# Patient Record
Sex: MALE | Race: WHITE | NOT HISPANIC OR LATINO | Employment: UNEMPLOYED | URBAN - METROPOLITAN AREA
[De-identification: names, ages, dates, MRNs, and addresses within clinical notes are randomized per-mention and may not be internally consistent; named-entity substitution may affect disease eponyms.]

---

## 2018-05-12 ENCOUNTER — HOSPITAL ENCOUNTER (EMERGENCY)
Facility: HOSPITAL | Age: 38
Discharge: HOME | End: 2018-05-13
Attending: EMERGENCY MEDICINE
Payer: COMMERCIAL

## 2018-05-12 VITALS
OXYGEN SATURATION: 98 % | DIASTOLIC BLOOD PRESSURE: 56 MMHG | SYSTOLIC BLOOD PRESSURE: 115 MMHG | HEIGHT: 68 IN | HEART RATE: 66 BPM | BODY MASS INDEX: 37.89 KG/M2 | RESPIRATION RATE: 18 BRPM | WEIGHT: 250 LBS | TEMPERATURE: 97.5 F

## 2018-05-12 DIAGNOSIS — M54.50 LOWER BACK PAIN: Primary | ICD-10-CM

## 2018-05-12 LAB
BILIRUB UR QL STRIP: NEGATIVE
CLARITY UR: CLEAR
COLOR UR: YELLOW
GLUCOSE UR STRIP-MCNC: NEGATIVE MG/DL
HGB UR QL STRIP.AUTO: ABNORMAL
KETONES UR STRIP-MCNC: NEGATIVE MG/DL
LEUKOCYTE ESTERASE UR QL STRIP: NEGATIVE
NITRITE UR QL STRIP: NEGATIVE
PH UR STRIP.AUTO: 7 [PH] (ref 4.5–8)
PROT UR STRIP-MCNC: NEGATIVE MG/DL
SP GR UR STRIP.AUTO: 1.02 (ref 1–1.03)
UROBILINOGEN UR QL STRIP.AUTO: 0.2 E.U./DL

## 2018-05-12 PROCEDURE — 81001 URINALYSIS AUTO W/SCOPE: CPT

## 2018-05-12 RX ORDER — LIDOCAINE 50 MG/G
1 PATCH TOPICAL ONCE
Status: DISCONTINUED | OUTPATIENT
Start: 2018-05-12 | End: 2018-05-13 | Stop reason: HOSPADM

## 2018-05-12 RX ORDER — IBUPROFEN 600 MG/1
600 TABLET ORAL ONCE
Status: COMPLETED | OUTPATIENT
Start: 2018-05-12 | End: 2018-05-12

## 2018-05-12 RX ORDER — METHOCARBAMOL 500 MG/1
500 TABLET, FILM COATED ORAL ONCE
Status: COMPLETED | OUTPATIENT
Start: 2018-05-12 | End: 2018-05-12

## 2018-05-12 RX ADMIN — IBUPROFEN 600 MG: 600 TABLET ORAL at 22:51

## 2018-05-12 RX ADMIN — LIDOCAINE 1 PATCH: 50 PATCH CUTANEOUS at 22:50

## 2018-05-12 RX ADMIN — METHOCARBAMOL 500 MG: 500 TABLET ORAL at 22:51

## 2018-05-13 LAB
BACTERIA UR QL AUTO: NORMAL /HPF
HYALINE CASTS #/AREA URNS LPF: NORMAL /LPF
NON-SQ EPI CELLS URNS QL MICRO: NORMAL /HPF
RBC #/AREA URNS AUTO: NORMAL /HPF
WBC #/AREA URNS AUTO: NORMAL /HPF

## 2018-05-13 PROCEDURE — 99283 EMERGENCY DEPT VISIT LOW MDM: CPT

## 2018-05-13 RX ORDER — ACETAMINOPHEN 325 MG/1
975 TABLET ORAL ONCE
Status: COMPLETED | OUTPATIENT
Start: 2018-05-13 | End: 2018-05-13

## 2018-05-13 RX ORDER — DIAZEPAM 5 MG/1
10 TABLET ORAL ONCE
Status: COMPLETED | OUTPATIENT
Start: 2018-05-13 | End: 2018-05-13

## 2018-05-13 RX ADMIN — DIAZEPAM 10 MG: 5 TABLET ORAL at 00:38

## 2018-05-13 RX ADMIN — ACETAMINOPHEN 975 MG: 325 TABLET, FILM COATED ORAL at 00:39

## 2018-05-13 NOTE — ED PROVIDER NOTES
History  Chief Complaint   Patient presents with    Back Pain     pt c/o lower back pain  States it hurt all week but got worse today  Pt unable to ambulate d/t pain  Denies numbness or tingling  Assist to get out of car  HPI  This is a 36-year-old male with no medical history, presents with acute back pain  The patient states that he was having some back pain all week, however became worse after he lifted a cat litter off the ground, which she noted to be particularly heavy  He used an  Lidoderm patch that his grandmother had, as well as took a dose of ibuprofen, which did not significantly help his pain, any progressively got worse over the course the day, so he came to the emergency department for further evaluation  He states that his pain is lower back in location, as well as diffusely over his paraspinal muscles, and his midline spine  He states the pain is worse when walking, and worse with moving, especially when moving his left leg  He denies any sharp, shooting pains down his legs  He denies any fevers, chills, chest pain, shortness of breath, abdominal pain, nausea, vomiting, diarrhea  He has no urinary retention or incontinence, or tenesmus or incontinence of stool  The patient denies any history of IV drug abuse, as well as a history of diabetes, or steroid use  He does have a history of multiple masses all over his body which he has had for 7 years, which are soft, which the patient thinks are lipomas, but he has never actually had these worked up  None       Past Medical History:   Diagnosis Date    Back pain        History reviewed  No pertinent surgical history  History reviewed  No pertinent family history  I have reviewed and agree with the history as documented  Social History   Substance Use Topics    Smoking status: Never Smoker    Smokeless tobacco: Never Used    Alcohol use No        Review of Systems   Constitutional: Negative for chills and fever     HENT: Negative for congestion and hearing loss  Eyes: Negative for photophobia and visual disturbance  Respiratory: Negative for cough and shortness of breath  Cardiovascular: Negative for chest pain and palpitations  Gastrointestinal: Negative for abdominal pain, diarrhea, nausea and vomiting  Endocrine: Negative for polyphagia and polyuria  Genitourinary: Negative for difficulty urinating, dysuria and frequency  Musculoskeletal: Positive for back pain  Negative for neck pain and neck stiffness  Skin: Negative for pallor and rash  Neurological: Negative for light-headedness and headaches  Physical Exam  ED Triage Vitals   Temperature Pulse Respirations Blood Pressure SpO2   05/12/18 2157 05/12/18 2157 05/12/18 2156 05/12/18 2157 05/12/18 2157   97 5 °F (36 4 °C) 69 (!) 28 144/79 100 %      Temp Source Heart Rate Source Patient Position - Orthostatic VS BP Location FiO2 (%)   05/12/18 2157 05/12/18 2156 05/12/18 2156 05/12/18 2156 --   Tympanic Monitor Sitting Left arm       Pain Score       05/12/18 2156       Worst Possible Pain           Orthostatic Vital Signs  Vitals:    05/12/18 2156 05/12/18 2157 05/12/18 2336   BP:  144/79 115/56   Pulse:  69 66   Patient Position - Orthostatic VS: Sitting  Lying       Physical Exam   Constitutional: He is oriented to person, place, and time  Awake and alert, well appearing, severe distress secondary to pain  No increased respiratory effort  Nontoxic in appearance   HENT:   Head: Normocephalic  Mouth/Throat: Oropharynx is clear and moist  No oropharyngeal exudate  Eyes: Pupils are equal, round, and reactive to light  No scleral icterus  Neck: Normal range of motion  No JVD present  Cardiovascular: Normal rate, regular rhythm and normal heart sounds  No murmur heard  Pulmonary/Chest: Effort normal  No respiratory distress  He has no wheezes  He has no rales  Abdominal: Soft  He exhibits no distension  There is no tenderness  Genitourinary:   Genitourinary Comments: Rectum is normal  Normal rectal tone  No saddle anesthesia  Musculoskeletal:   There is significant tenderness to palpation over the paraspinal muscles, as well as the midline spine in the L2-L5 distribution  No overlying external signs of trauma  Neurological: He is alert and oriented to person, place, and time  He exhibits normal muscle tone  Skin: Skin is warm and dry  No rash noted  No pallor         ED Medications  Medications   lidocaine (LIDODERM) 5 % patch 1 patch (1 patch Transdermal Medication Applied 5/12/18 2250)   ibuprofen (MOTRIN) tablet 600 mg (600 mg Oral Given 5/12/18 2251)   methocarbamol (ROBAXIN) tablet 500 mg (500 mg Oral Given 5/12/18 2251)   diazepam (VALIUM) tablet 10 mg (10 mg Oral Given 5/13/18 0038)   acetaminophen (TYLENOL) tablet 975 mg (975 mg Oral Given 5/13/18 0039)       Diagnostic Studies  Results Reviewed     Procedure Component Value Units Date/Time    Urine Microscopic [[id number]]  (Normal) Collected:  05/12/18 2338    Lab Status:  Final result Specimen:  Urine from Urine, Clean Catch Updated:  05/13/18 0001     RBC, UA None Seen /hpf      WBC, UA None Seen /hpf      Epithelial Cells None Seen /hpf      Bacteria, UA None Seen /hpf      Hyaline Casts, UA None Seen /lpf     ED Urine Macroscopic [[id number]]  (Abnormal) Collected:  05/12/18 2338    Lab Status:  Final result Specimen:  Urine Updated:  05/12/18 2334     Color, UA Yellow     Clarity, UA Clear     pH, UA 7 0     Leukocytes, UA Negative     Nitrite, UA Negative     Protein, UA Negative mg/dl      Glucose, UA Negative mg/dl      Ketones, UA Negative mg/dl      Urobilinogen, UA 0 2 E U /dl      Bilirubin, UA Negative     Blood, UA Trace (A)     Specific [place], UA 1 020    Narrative:       CLINITEK RESULT    POCT urinalysis dipstick [[id number]]     Lab Status:  No result Specimen:  Urine                  No orders to display         Procedures  Procedures      Phone Consults  ED Phone Contact    ED Course                               MDM  Number of Diagnoses or Management Options  Lower back pain:   Diagnosis management comments: This is a 31-year-old male who presents with acute onset of back pain, after bending over and lifting a heavy hunter litter off the ground, resulting in immediate severe pain  He is currently hemodynamically stable, but appears in significant discomfort due to his back pain  On exam, he has significant tenderness in the paraspinal muscles at the midline spine of the L2-L5 distribution, without any external signs of trauma  He has no saddle anesthesia, normal rectal tone, and denies any urinary retension, tenesmus, or incontinence  Patient was given ibuprofen and Robaxin, as well as a Lidoderm patch, without significant relief  He was also given a dose of Tylenol, and p o  Valium, and at this point felt comfortable enough to stand up and walk  Likely muscle strain versus herniated disc, but without cauda equina based on his history and my physical exam   Patient be discharged home  Strict return precautions were provided  I instructed to follow up with his primary care physician at his earliest convenience  CritCare Time    Disposition  Final diagnoses:   Lower back pain     Time reflects when diagnosis was documented in both MDM as applicable and the Disposition within this note     Time User Action Codes Description Comment    5/13/2018  1:02 AM Meliza Goddard Add [M54 5] Lower back pain       ED Disposition     ED Disposition Condition Comment    Discharge  [facility] discharge to home/self care      Condition at discharge: Good        Follow-up Information     Follow up With Specialties Details Why Contact Info    Kavitha Tesfaye MD  Schedule an appointment as soon as possible for a visit in 1 day  [address] [address]  55 JOSHUA Arevalo Se, MD Orthopedic Surgery Call As needed after following up with your primary care physician [address] [place] [address]  [phone]          There are no discharge medications for this patient  No discharge procedures on file  ED Provider  Attending physically available and evaluated Kiki Aspen  I managed the patient along with the ED Attending      Electronically Signed by         Lily Rdz MD  05/13/18 6690

## 2018-05-13 NOTE — ED NOTES
Pt assisted to room via wheelchair  Staff assisted patient to bed safely x 3  Pt states he has lower back pain and pressure in his pelvic/lower back area        Maria Fernanda Lang RN  05/12/18 2348

## 2018-05-13 NOTE — ED ATTENDING ATTESTATION
Trever Roman MD, saw and evaluated the patient  I have discussed the patient with the resident/non-physician practitioner and agree with the resident's/non-physician practitioner's findings, Plan of Care, and MDM as documented in the resident's/non-physician practitioner's note, except where noted  All available labs and Radiology studies were reviewed  At this point I agree with the current assessment done in the Emergency Department  I have conducted an independent evaluation of this patient including a focused history and a physical exam     36-year-old male presenting to the emergency department for bilateral paraspinal lumbar pain  Patient reports fairly abrupt onset, approximately 10-15 minutes after having an injury to his back where he was lifting a 40 pound box of cat litter which started as slipped and he attempted to catch it  The patient had initial pain in the back that then worsened of her 10-15 minutes  No bowel or bladder incontinence  No history of IV drug use  No injections  No diabetes  No saddle anesthesia  Ten systems reviewed negative except as noted  On examination patient is lying on his side and is moderately uncomfortable  Abdomen is soft and nontender to palpation  Patient has tenderness to palpation of bilateral inguinal canals without any appreciated inguinal hernia  Patient has diffuse tenderness of his bilateral lumbar paraspinal musculature  Motor is 5/5 bilateral lower extremities  Sensation intact  Skin warm and dry  Assessment and plan:  36-year-old male musculoskeletal low back pain  Patient will be treated symptomatically and reassess      Labs Reviewed   ED URINE MACROSCOPIC - Abnormal        Result Value Ref Range Status    Color, UA Yellow   Final    Clarity, UA Clear   Final    pH, UA 7 0  4 5 - 8 0 Final    Leukocytes, UA Negative  Negative Final    Nitrite, UA Negative  Negative Final    Protein, UA Negative  Negative mg/dl Final    Glucose, UA Negative  Negative mg/dl Final    Ketones, UA Negative  Negative mg/dl Final    Urobilinogen, UA 0 2  0 2, 1 0 E U /dl E U /dl Final    Bilirubin, UA Negative  Negative Final    Blood, UA Trace (*) Negative Final    Specific Gravity, UA 1 020  1 003 - 1 030 Final    Narrative:     CLINITEK RESULT   URINE MICROSCOPIC - Normal    RBC, UA None Seen  None Seen, 0-5 /hpf Final    WBC, UA None Seen  None Seen, 0-5, 5-55, 5-65 /hpf Final    Epithelial Cells None Seen  None Seen, Occasional /hpf Final    Bacteria, UA None Seen  None Seen, Occasional /hpf Final    Hyaline Casts, UA None Seen  None Seen /lpf Final

## 2018-05-13 NOTE — DISCHARGE INSTRUCTIONS
Acute Low Back Pain   WHAT YOU NEED TO KNOW:   Acute low back pain is sudden discomfort in your lower back area that lasts for up to 6 weeks  The discomfort makes it difficult to tolerate activity  DISCHARGE INSTRUCTIONS:   Return to the emergency department if:   · You have severe pain  · You have sudden stiffness and heaviness on both buttocks down to both legs  · You have numbness or weakness in one leg, or pain in both legs  · You have numbness in your genital area or across your lower back  · You cannot control your urine or bowel movements  Contact your healthcare provider if:   · You have a fever  · You have pain at night or when you rest     · Your pain does not get better with treatment  · You have pain that worsens when you cough or sneeze  · You suddenly feel something pop or snap in your back  · You have questions or concerns about your condition or care  Medicines: The following medicines may be ordered by your healthcare provider:  · Acetaminophen  decreases pain  It is available without a doctor's order  Ask how much to take and how often to take it  Follow directions  Acetaminophen can cause liver damage if not taken correctly  · NSAIDs  help decrease swelling and pain  This medicine is available with or without a doctor's order  NSAIDs can cause stomach bleeding or kidney problems in certain people  If you take blood thinner medicine, always ask your healthcare provider if NSAIDs are safe for you  Always read the medicine label and follow directions  · Prescription pain medicine  may be given  Ask your healthcare provider how to take this medicine safely  · Muscle relaxers  decrease pain by relaxing the muscles in your lower spine  · Take your medicine as directed  Contact your healthcare provider if you think your medicine is not helping or if you have side effects  Tell him of her if you are allergic to any medicine   Keep a list of the medicines, vitamins, and herbs you take  Include the amounts, and when and why you take them  Bring the list or the pill bottles to follow-up visits  Carry your medicine list with you in case of an emergency  Self-care:   · Stay active  as much as you can without causing more pain  Bed rest could make your back pain worse  Start with some light exercises such as walking  Avoid heavy lifting until your pain is gone  Ask for more information about the activities or exercises that are right for you  · Ice  helps decrease swelling, pain, and muscle spams  Put crushed ice in a plastic bag  Cover it with a towel  Place it on your lower back for 20 to 30 minutes every 2 hours  Do this for about 2 to 3 days after your pain starts, or as directed  · Heat  helps decrease pain and muscle spasms  Start to use heat after treatment with ice has stopped  Use a small towel dampened with warm water or a heating pad, or sit in a warm bath  Apply heat on the area for 20 to 30 minutes every 2 hours for as many days as directed  Alternate heat and ice  Prevent acute low back pain:   · Use proper body mechanics  ¨ Bend at the hips and knees when you  objects  Do not bend from the waist  Use your leg muscles as you lift the load  Do not use your back  Keep the object close to your chest as you lift it  Try not to twist or lift anything above your waist     ¨ Change your position often when you stand for long periods of time  Rest one foot on a small box or footrest, and then switch to the other foot often  ¨ Try not to sit for long periods of time  When you do, sit in a straight-backed chair with your feet flat on the floor  Never reach, pull, or push while you are sitting  · Do exercises that strengthen your back muscles  Warm up before you exercise  Ask your healthcare provider the best exercises for you  · Maintain a healthy weight  Ask your healthcare provider how much you should weigh   Ask him to help you create a weight loss plan if you are overweight  Follow up with your healthcare provider as directed:  Return for a follow-up visit if you still have pain after 1 to 3 weeks of treatment  You may need to visit an orthopedist if your back pain lasts more than 12 weeks  Write down your questions so you remember to ask them during your visits  © 2017 [employer]  Information is for End User's use only and may not be sold, redistributed or otherwise used for commercial purposes  All illustrations and images included in CareNotes® are the copyrighted property of [employer]  or [employer]  The above information is an  only  It is not intended as medical advice for individual conditions or treatments  Talk to your doctor, nurse or pharmacist before following any medical regimen to see if it is safe and effective for you

## 2024-08-26 ENCOUNTER — OFFICE VISIT (OUTPATIENT)
Dept: INTERNAL MEDICINE CLINIC | Facility: CLINIC | Age: 44
End: 2024-08-26
Payer: COMMERCIAL

## 2024-08-26 VITALS
HEART RATE: 59 BPM | TEMPERATURE: 97.6 F | RESPIRATION RATE: 18 BRPM | HEIGHT: 68 IN | SYSTOLIC BLOOD PRESSURE: 110 MMHG | BODY MASS INDEX: 34.71 KG/M2 | WEIGHT: 229 LBS | DIASTOLIC BLOOD PRESSURE: 64 MMHG | OXYGEN SATURATION: 97 %

## 2024-08-26 DIAGNOSIS — R59.0 LYMPHADENOPATHY, AXILLARY: ICD-10-CM

## 2024-08-26 DIAGNOSIS — Z00.00 HEALTHCARE MAINTENANCE: Primary | ICD-10-CM

## 2024-08-26 DIAGNOSIS — R19.7 DIARRHEA, UNSPECIFIED TYPE: ICD-10-CM

## 2024-08-26 PROCEDURE — 99203 OFFICE O/P NEW LOW 30 MIN: CPT | Performed by: INTERNAL MEDICINE

## 2024-08-26 NOTE — PROGRESS NOTES
Ambulatory Visit  Name: Saturnino Dumont      : 1980      MRN: [id number]  Encounter Provider: Eleazar Blum DO  Encounter Date: 2024   Encounter department: [facility] INTERNAL MEDICINE    Assessment & Plan   1. Healthcare maintenance  Assessment & Plan:  Patient is a 43-year-old male with a history of lumbar disc disease status postlaminectomy in the past, no other major medical problems who is here today to establish care in our medical practice.  His major concern is having problems with frequent loose stools since May of this year.  He states he does have some abdominal cramping associated with this but not severe.  No nausea vomiting no black stools blood in his stools and no problems with weight loss or decreased appetite.  Patient is not taking any over-the-counter medication in order to help with this and has had no medical workup and evaluation.  Patient otherwise has a complaint of some intermittent enlarged lymph nodes to the axillary region which do resolve when he stops using deodorant.  Patient did undergo physical exam today in the office but no acute abnormalities.  Patient will be going for routine lab testing including stool testing.  He was reassured we will call patient if there is any abnormalities that are acute that we need to address at this point in time.  He will be seen again in the office approximately 2 to 3 weeks for reevaluation.  We placed a consult for the patient to be seen by gastroenterology for his frequent loose stools.  Orders:  -     Comprehensive metabolic panel; Future; Expected date: 2024  -     CBC and differential; Future; Expected date: 2024  -     Lipid panel; Future; Expected date: 2024  -     Urinalysis with microscopic; Future  -     Stool Enteric Bacterial Panel by PCR; Future  -     Ambulatory Referral to Gastroenterology; Future  2. Diarrhea, unspecified type  Assessment & Plan:  Again frequent loose stools which she  states is new for him.  No abdominal severe pain or cramping no black stools or blood in the stools.  Patient does have some urgency to move his bowels.  Denies any food intolerance but he admits that his diet is not perfect.  We will check routine stool test.  Patient will be also going for routine labs and further lab testing as indicated.  We will call the patient if there is any abnormal lab values that we need to correct at this point in time or for further evaluation.  Orders:  -     Ambulatory Referral to Gastroenterology; Future  3. Lymphadenopathy, axillary  Assessment & Plan:  Patient states that he has had recurrence of enlarged lymph nodes to the axillary region bilaterally intermittently.  He states that when he stopped using deodorant this usually goes away on its own.  No lymphadenopathy noted on exam today.  Will check routine labs including a CBC and full physical exam with his next visit.         History of Present Illness     Patient is a 43-year-old male history of lumbar disc disease with laminectomy in the past who is here today to establish care in our medical practice complaining of persistent difficulty with loose stools since May of this year.  Denies any black stools or blood in the stools no severe abdominal pain or cramping.  No nausea or vomiting.  Is also complaining of some lymphadenopathy axillary region bilaterally episodic pain      Review of Systems   Constitutional: Negative.    HENT: Negative.     Eyes: Negative.    Respiratory: Negative.     Cardiovascular: Negative.    Gastrointestinal: Negative.         Frequent loose stools.  States that this is not diarrhea.  Has some fecal urgency   Endocrine: Negative.    Genitourinary: Negative.    Musculoskeletal: Negative.    Skin: Negative.    Allergic/Immunologic: Negative.    Neurological: Negative.    Hematological:  Positive for adenopathy. Does not bruise/bleed easily.   Psychiatric/Behavioral: Negative.       Past Medical  "History:   Diagnosis Date    Back pain      No past surgical history on file.  No family history on file.  Social History     Tobacco Use    Smoking status: Never    Smokeless tobacco: Never   Vaping Use    Vaping status: Never Used   Substance and Sexual Activity    Alcohol use: No    Drug use: No    Sexual activity: Not on file     No current outpatient medications on file prior to visit.     No Known Allergies    There is no immunization history on file for this patient.  Objective     /64   Pulse 59   Temp 97.6 °F (36.4 °C)   Resp 18   Ht 5' 8\" (1.727 m)   Wt 104 kg (229 lb)   SpO2 97%   BMI 34.82 kg/m²     Physical Exam  Vitals and nursing note reviewed.   Constitutional:       General: He is not in acute distress.     Appearance: Normal appearance. He is obese. He is not ill-appearing, toxic-appearing or diaphoretic.      Comments: Pleasant, articulate, obese 43-year-old male who is awake alert in no acute distress oriented x 3   HENT:      Head: Normocephalic and atraumatic.      Right Ear: Tympanic membrane, ear canal and external ear normal. There is no impacted cerumen.      Left Ear: Tympanic membrane, ear canal and external ear normal. There is no impacted cerumen.      Nose: Nose normal. No congestion or rhinorrhea.      Mouth/Throat:      Mouth: Mucous membranes are moist.      Pharynx: Oropharynx is clear. No oropharyngeal exudate or posterior oropharyngeal erythema.   Eyes:      General: No scleral icterus.        Right eye: No discharge.         Left eye: No discharge.      Extraocular Movements: Extraocular movements intact.      Conjunctiva/sclera: Conjunctivae normal.      Pupils: Pupils are equal, round, and reactive to light.   Neck:      Vascular: No carotid bruit.   Cardiovascular:      Rate and Rhythm: Normal rate and regular rhythm.      Pulses: Normal pulses.      Heart sounds: Normal heart sounds. No murmur heard.     No friction rub. No gallop.   Pulmonary:      Effort: " Pulmonary effort is normal. No respiratory distress.      Breath sounds: Normal breath sounds. No stridor. No wheezing, rhonchi or rales.   Chest:      Chest wall: No tenderness.   Abdominal:      General: Abdomen is flat. Bowel sounds are normal. There is no distension.      Palpations: Abdomen is soft. There is no mass.      Tenderness: There is no abdominal tenderness. There is no right CVA tenderness, left CVA tenderness, guarding or rebound.      Hernia: No hernia is present.   Musculoskeletal:         General: No swelling, tenderness, deformity or signs of injury. Normal range of motion.      Cervical back: Normal range of motion and neck supple. No rigidity or tenderness.      Right lower leg: No edema.      Left lower leg: No edema.   Lymphadenopathy:      Cervical: No cervical adenopathy.   Skin:     General: Skin is warm.      Capillary Refill: Capillary refill takes less than 2 seconds.      Coloration: Skin is not jaundiced or pale.      Findings: Lesion present. No bruising, erythema or rash.      Comments: Multiple subcutaneous nodules to the abdomen, chest wall, lower extremities.  He states he has had these biopsied and these are benign lesions.   Neurological:      General: No focal deficit present.      Mental Status: He is alert and oriented to person, place, and time. Mental status is at baseline.      Cranial Nerves: No cranial nerve deficit.      Sensory: No sensory deficit.      Motor: No weakness.      Coordination: Coordination normal.      Gait: Gait normal.      Deep Tendon Reflexes: Reflexes normal.   Psychiatric:         Mood and Affect: Mood normal.         Behavior: Behavior normal.         Thought Content: Thought content normal.         Judgment: Judgment normal.

## 2024-08-26 NOTE — ASSESSMENT & PLAN NOTE
Patient is a 43-year-old male with a history of lumbar disc disease status postlaminectomy in the past, no other major medical problems who is here today to establish care in our medical practice.  His major concern is having problems with frequent loose stools since May of this year.  He states he does have some abdominal cramping associated with this but not severe.  No nausea vomiting no black stools blood in his stools and no problems with weight loss or decreased appetite.  Patient is not taking any over-the-counter medication in order to help with this and has had no medical workup and evaluation.  Patient otherwise has a complaint of some intermittent enlarged lymph nodes to the axillary region which do resolve when he stops using deodorant.  Patient did undergo physical exam today in the office but no acute abnormalities.  Patient will be going for routine lab testing including stool testing.  He was reassured we will call patient if there is any abnormalities that are acute that we need to address at this point in time.  He will be seen again in the office approximately 2 to 3 weeks for reevaluation.  We placed a consult for the patient to be seen by gastroenterology for his frequent loose stools.

## 2024-08-26 NOTE — ASSESSMENT & PLAN NOTE
Patient states that he has had recurrence of enlarged lymph nodes to the axillary region bilaterally intermittently.  He states that when he stopped using deodorant this usually goes away on its own.  No lymphadenopathy noted on exam today.  Will check routine labs including a CBC and full physical exam with his next visit.

## 2024-08-26 NOTE — ASSESSMENT & PLAN NOTE
Again frequent loose stools which she states is new for him.  No abdominal severe pain or cramping no black stools or blood in the stools.  Patient does have some urgency to move his bowels.  Denies any food intolerance but he admits that his diet is not perfect.  We will check routine stool test.  Patient will be also going for routine labs and further lab testing as indicated.  We will call the patient if there is any abnormal lab values that we need to correct at this point in time or for further evaluation.

## 2024-08-30 ENCOUNTER — APPOINTMENT (OUTPATIENT)
Dept: LAB | Facility: CLINIC | Age: 44
End: 2024-08-30
Payer: COMMERCIAL

## 2024-08-30 DIAGNOSIS — Z00.00 HEALTHCARE MAINTENANCE: ICD-10-CM

## 2024-08-30 LAB
ALBUMIN SERPL BCG-MCNC: 4.3 G/DL (ref 3.5–5)
ALP SERPL-CCNC: 71 U/L (ref 34–104)
ALT SERPL W P-5'-P-CCNC: 25 U/L (ref 7–52)
ANION GAP SERPL CALCULATED.3IONS-SCNC: 9 MMOL/L (ref 4–13)
AST SERPL W P-5'-P-CCNC: 18 U/L (ref 13–39)
BACTERIA UR QL AUTO: ABNORMAL /HPF
BASOPHILS # BLD AUTO: 0.03 THOUSANDS/ÂΜL (ref 0–0.1)
BASOPHILS NFR BLD AUTO: 0 % (ref 0–1)
BILIRUB SERPL-MCNC: 0.62 MG/DL (ref 0.2–1)
BILIRUB UR QL STRIP: NEGATIVE
BUN SERPL-MCNC: 20 MG/DL (ref 5–25)
CALCIUM SERPL-MCNC: 9.3 MG/DL (ref 8.4–10.2)
CHLORIDE SERPL-SCNC: 104 MMOL/L (ref 96–108)
CHOLEST SERPL-MCNC: 204 MG/DL
CLARITY UR: CLEAR
CO2 SERPL-SCNC: 26 MMOL/L (ref 21–32)
COLOR UR: ABNORMAL
CREAT SERPL-MCNC: 0.76 MG/DL (ref 0.6–1.3)
EOSINOPHIL # BLD AUTO: 0.1 THOUSAND/ÂΜL (ref 0–0.61)
EOSINOPHIL NFR BLD AUTO: 2 % (ref 0–6)
ERYTHROCYTE [DISTWIDTH] IN BLOOD BY AUTOMATED COUNT: 13.2 % (ref 11.6–15.1)
GFR SERPL CREATININE-BSD FRML MDRD: 112 ML/MIN/1.73SQ M
GLUCOSE P FAST SERPL-MCNC: 97 MG/DL (ref 65–99)
GLUCOSE UR STRIP-MCNC: NEGATIVE MG/DL
HCT VFR BLD AUTO: 43.2 % (ref 36.5–49.3)
HDLC SERPL-MCNC: 58 MG/DL
HGB BLD-MCNC: 14.7 G/DL (ref 12–17)
HGB UR QL STRIP.AUTO: NEGATIVE
IMM GRANULOCYTES # BLD AUTO: 0.02 THOUSAND/UL (ref 0–0.2)
IMM GRANULOCYTES NFR BLD AUTO: 0 % (ref 0–2)
KETONES UR STRIP-MCNC: NEGATIVE MG/DL
LDLC SERPL CALC-MCNC: 131 MG/DL (ref 0–100)
LEUKOCYTE ESTERASE UR QL STRIP: NEGATIVE
LYMPHOCYTES # BLD AUTO: 2.84 THOUSANDS/ÂΜL (ref 0.6–4.47)
LYMPHOCYTES NFR BLD AUTO: 42 % (ref 14–44)
MCH RBC QN AUTO: 28.4 PG (ref 26.8–34.3)
MCHC RBC AUTO-ENTMCNC: 34 G/DL (ref 31.4–37.4)
MCV RBC AUTO: 83 FL (ref 82–98)
MONOCYTES # BLD AUTO: 0.83 THOUSAND/ÂΜL (ref 0.17–1.22)
MONOCYTES NFR BLD AUTO: 12 % (ref 4–12)
NEUTROPHILS # BLD AUTO: 2.92 THOUSANDS/ÂΜL (ref 1.85–7.62)
NEUTS SEG NFR BLD AUTO: 44 % (ref 43–75)
NITRITE UR QL STRIP: NEGATIVE
NON-SQ EPI CELLS URNS QL MICRO: ABNORMAL /HPF
NONHDLC SERPL-MCNC: 146 MG/DL
NRBC BLD AUTO-RTO: 0 /100 WBCS
PH UR STRIP.AUTO: 7 [PH]
PLATELET # BLD AUTO: 238 THOUSANDS/UL (ref 149–390)
PMV BLD AUTO: 10.1 FL (ref 8.9–12.7)
POTASSIUM SERPL-SCNC: 4.3 MMOL/L (ref 3.5–5.3)
PROT SERPL-MCNC: 6.9 G/DL (ref 6.4–8.4)
PROT UR STRIP-MCNC: NEGATIVE MG/DL
RBC # BLD AUTO: 5.18 MILLION/UL (ref 3.88–5.62)
RBC #/AREA URNS AUTO: ABNORMAL /HPF
SODIUM SERPL-SCNC: 139 MMOL/L (ref 135–147)
SP GR UR STRIP.AUTO: 1.01 (ref 1–1.03)
TRIGL SERPL-MCNC: 77 MG/DL
UROBILINOGEN UR STRIP-ACNC: <2 MG/DL
WBC # BLD AUTO: 6.74 THOUSAND/UL (ref 4.31–10.16)
WBC #/AREA URNS AUTO: ABNORMAL /HPF

## 2024-08-30 PROCEDURE — 80061 LIPID PANEL: CPT

## 2024-08-30 PROCEDURE — 85025 COMPLETE CBC W/AUTO DIFF WBC: CPT

## 2024-08-30 PROCEDURE — 81001 URINALYSIS AUTO W/SCOPE: CPT

## 2024-08-30 PROCEDURE — 80053 COMPREHEN METABOLIC PANEL: CPT

## 2024-08-30 PROCEDURE — 36415 COLL VENOUS BLD VENIPUNCTURE: CPT

## 2024-08-31 ENCOUNTER — APPOINTMENT (OUTPATIENT)
Dept: LAB | Facility: CLINIC | Age: 44
End: 2024-08-31
Payer: COMMERCIAL

## 2024-08-31 DIAGNOSIS — Z00.00 HEALTHCARE MAINTENANCE: ICD-10-CM

## 2024-08-31 PROCEDURE — 87505 NFCT AGENT DETECTION GI: CPT

## 2024-09-01 LAB
C COLI+JEJUNI TUF STL QL NAA+PROBE: NEGATIVE
EC STX1+STX2 GENES STL QL NAA+PROBE: NEGATIVE
SALMONELLA SP SPAO STL QL NAA+PROBE: NEGATIVE
SHIGELLA SP+EIEC IPAH STL QL NAA+PROBE: NEGATIVE

## 2024-09-04 ENCOUNTER — TELEPHONE (OUTPATIENT)
Age: 44
End: 2024-09-04

## 2024-09-04 NOTE — TELEPHONE ENCOUNTER
Patient called in with some concerns to his back. A little less then two years ago he went through surgery for Cada equina. States that its not common, however he had surgery and went to therapy for a while. Appears to be until about 6 months ago. In the past 3 or 4 days he's noticed that his back discomfort is coming back. States that the first thing that alerted him was he was trying to go to the bathroom and noticed that he did not have control. He was wondering what next steps are. Can he get another PT referral, or should he be coming in for an appointment sooner. He has a follow up appointment on 9/18 but wants to make sure there is enough time to discuss  this then if its appropriate for him to wait till then. Please advise. He is concerned as he mentioned that he does not want this to continue worsening and then him end up paralyzed.

## 2024-09-05 DIAGNOSIS — M54.9 BACK PAIN, UNSPECIFIED BACK LOCATION, UNSPECIFIED BACK PAIN LATERALITY, UNSPECIFIED CHRONICITY: Primary | ICD-10-CM

## 2024-09-18 ENCOUNTER — OFFICE VISIT (OUTPATIENT)
Age: 44
End: 2024-09-18
Payer: COMMERCIAL

## 2024-09-18 VITALS
BODY MASS INDEX: 34.86 KG/M2 | HEIGHT: 68 IN | TEMPERATURE: 97.2 F | RESPIRATION RATE: 16 BRPM | SYSTOLIC BLOOD PRESSURE: 106 MMHG | WEIGHT: 230 LBS | HEART RATE: 58 BPM | OXYGEN SATURATION: 98 % | DIASTOLIC BLOOD PRESSURE: 64 MMHG

## 2024-09-18 DIAGNOSIS — R19.7 DIARRHEA, UNSPECIFIED TYPE: ICD-10-CM

## 2024-09-18 DIAGNOSIS — E78.01 FAMILIAL HYPERCHOLESTEROLEMIA: Primary | ICD-10-CM

## 2024-09-18 DIAGNOSIS — Z00.00 HEALTHCARE MAINTENANCE: ICD-10-CM

## 2024-09-18 PROCEDURE — 99214 OFFICE O/P EST MOD 30 MIN: CPT | Performed by: INTERNAL MEDICINE

## 2024-09-18 RX ORDER — ROSUVASTATIN CALCIUM 5 MG/1
5 TABLET, COATED ORAL DAILY
Qty: 30 TABLET | Refills: 5 | Status: SHIPPED | OUTPATIENT
Start: 2024-09-18

## 2024-09-18 NOTE — ASSESSMENT & PLAN NOTE
As noted cholesterol is high but and not in a dangerous range.  I still feel because of family history it is appropriate to start medication in order to better control his cholesterol and we did discuss the importance also of diet watching his intake of fats and cholesterol with his diet.  Start Crestor 5 mg daily check a lipid profile in 6 months.    Orders:    rosuvastatin (CRESTOR) 5 mg tablet; Take 1 tablet (5 mg total) by mouth daily    Lipid panel; Future

## 2024-09-18 NOTE — ASSESSMENT & PLAN NOTE
Patient did have complete labs performed prior to the visit today and he is here to discuss the results.  The only abnormality of concern is his cholesterol especially with a family history of cerebrovascular disease and after discussion with the patient we have made a decision to treat.  Patient will be placed on a low-dose of Crestor 5 mg daily.  We did discuss possible side effects of the medication including muscle aches and pains and allergic type reactions and he is told if any adverse reaction to the medication to call immediately.  Check a lipid profile in 6 months.

## 2024-09-18 NOTE — ASSESSMENT & PLAN NOTE
Hemoccult was negative.  Patient is still having loose stools.  Does have an appointment to be seen and evaluation by gastroenterology.

## 2024-09-18 NOTE — PROGRESS NOTES
Ambulatory Visit  Name: Saturnino Dumont      : 1980      MRN: [id number]  Encounter Provider: Eleazar Blum DO  Encounter Date: 2024   Encounter department: [facility]    Assessment & Plan  Healthcare maintenance  Patient did have complete labs performed prior to the visit today and he is here to discuss the results.  The only abnormality of concern is his cholesterol especially with a family history of cerebrovascular disease and after discussion with the patient we have made a decision to treat.  Patient will be placed on a low-dose of Crestor 5 mg daily.  We did discuss possible side effects of the medication including muscle aches and pains and allergic type reactions and he is told if any adverse reaction to the medication to call immediately.  Check a lipid profile in 6 months.         Familial hypercholesterolemia  As noted cholesterol is high but and not in a dangerous range.  I still feel because of family history it is appropriate to start medication in order to better control his cholesterol and we did discuss the importance also of diet watching his intake of fats and cholesterol with his diet.  Start Crestor 5 mg daily check a lipid profile in 6 months.    Orders:    rosuvastatin (CRESTOR) 5 mg tablet; Take 1 tablet (5 mg total) by mouth daily    Lipid panel; Future    Diarrhea, unspecified type  Hemoccult was negative.  Patient is still having loose stools.  Does have an appointment to be seen and evaluation by gastroenterology.              History of Present Illness     Patient is a 43-year-old male history of no major medical problems who is here today for follow-up.  Patient is just initiated treatment through our office and that he did have extensive lab testing performed prior to the visit today and is here to discuss the results.  Patient relates he is feeling relatively well physically but is under a lot of stress with illness with his grandmother  "who is 90 years old and recent accident injury to his mother who recently went through surgery because of accidental fall injury to the face and her cervical spine.      Review of Systems   Constitutional: Negative.    HENT: Negative.     Eyes: Negative.    Respiratory: Negative.     Cardiovascular: Negative.    Gastrointestinal: Negative.    Endocrine: Negative.    Genitourinary: Negative.    Musculoskeletal: Negative.    Skin: Negative.    Allergic/Immunologic: Negative.    Neurological: Negative.    Hematological: Negative.    Psychiatric/Behavioral: Negative.       Past Medical History:   Diagnosis Date    Back pain      No past surgical history on file.  No family history on file.  Social History     Tobacco Use    Smoking status: Never    Smokeless tobacco: Never   Vaping Use    Vaping status: Never Used   Substance and Sexual Activity    Alcohol use: No    Drug use: No    Sexual activity: Not on file     No current outpatient medications on file prior to visit.     No Known Allergies    There is no immunization history on file for this patient.  Objective     /64   Pulse 58   Temp (!) 97.2 °F (36.2 °C)   Resp 16   Ht 5' 8\" (1.727 m)   Wt 104 kg (230 lb)   SpO2 98%   BMI 34.97 kg/m²     Physical Exam  Vitals and nursing note reviewed.   Constitutional:       General: He is not in acute distress.     Appearance: Normal appearance. He is obese. He is not ill-appearing, toxic-appearing or diaphoretic.      Comments: Pleasant obese 43-year-old male who is awake alert in no acute distress oriented x 3   HENT:      Head: Normocephalic and atraumatic.      Right Ear: Tympanic membrane, ear canal and external ear normal. There is no impacted cerumen.      Left Ear: Tympanic membrane, ear canal and external ear normal. There is no impacted cerumen.      Nose: Nose normal. No congestion or rhinorrhea.      Mouth/Throat:      Mouth: Mucous membranes are moist.      Pharynx: Oropharynx is clear. No " oropharyngeal exudate or posterior oropharyngeal erythema.   Eyes:      General: No scleral icterus.        Right eye: No discharge.         Left eye: No discharge.      Extraocular Movements: Extraocular movements intact.      Conjunctiva/sclera: Conjunctivae normal.      Pupils: Pupils are equal, round, and reactive to light.   Neck:      Vascular: No carotid bruit.   Cardiovascular:      Rate and Rhythm: Normal rate and regular rhythm.      Pulses: Normal pulses.      Heart sounds: Normal heart sounds. No murmur heard.     No friction rub. No gallop.   Pulmonary:      Effort: Pulmonary effort is normal. No respiratory distress.      Breath sounds: Normal breath sounds. No stridor. No wheezing, rhonchi or rales.   Chest:      Chest wall: No tenderness.   Abdominal:      General: Abdomen is flat. Bowel sounds are normal. There is no distension.      Palpations: Abdomen is soft. There is no mass.      Tenderness: There is no abdominal tenderness. There is no right CVA tenderness, left CVA tenderness, guarding or rebound.      Hernia: No hernia is present.   Musculoskeletal:         General: No swelling, tenderness, deformity or signs of injury. Normal range of motion.      Cervical back: Normal range of motion and neck supple. No rigidity or tenderness.      Right lower leg: No edema.      Left lower leg: No edema.   Lymphadenopathy:      Cervical: No cervical adenopathy.   Skin:     General: Skin is warm.      Capillary Refill: Capillary refill takes less than 2 seconds.      Coloration: Skin is not jaundiced or pale.      Findings: No bruising, erythema, lesion or rash.      Comments: Multiple subcutaneous nodules to the abdomen, chest wall, lower extremities.  He states he has had these biopsied and these are benign lesions.   Neurological:      General: No focal deficit present.      Mental Status: He is alert and oriented to person, place, and time. Mental status is at baseline.      Cranial Nerves: No cranial  nerve deficit.      Sensory: No sensory deficit.      Motor: No weakness.      Coordination: Coordination normal.      Gait: Gait normal.      Deep Tendon Reflexes: Reflexes normal.   Psychiatric:         Behavior: Behavior normal.         Thought Content: Thought content normal.         Judgment: Judgment normal.      Comments: Very slightly anxious mood and affect today

## 2024-09-19 ENCOUNTER — CONSULT (OUTPATIENT)
Dept: GASTROENTEROLOGY | Facility: CLINIC | Age: 44
End: 2024-09-19
Payer: COMMERCIAL

## 2024-09-19 VITALS
TEMPERATURE: 96.9 F | WEIGHT: 227 LBS | HEIGHT: 68 IN | SYSTOLIC BLOOD PRESSURE: 100 MMHG | BODY MASS INDEX: 34.4 KG/M2 | DIASTOLIC BLOOD PRESSURE: 68 MMHG

## 2024-09-19 DIAGNOSIS — R14.0 BLOATING: ICD-10-CM

## 2024-09-19 DIAGNOSIS — R19.4 CHANGE IN BOWEL HABIT: ICD-10-CM

## 2024-09-19 DIAGNOSIS — R10.30 LOWER ABDOMINAL PAIN: ICD-10-CM

## 2024-09-19 DIAGNOSIS — R19.7 DIARRHEA, UNSPECIFIED TYPE: Primary | ICD-10-CM

## 2024-09-19 PROCEDURE — 99244 OFF/OP CNSLTJ NEW/EST MOD 40: CPT | Performed by: PHYSICIAN ASSISTANT

## 2024-09-19 RX ORDER — DICYCLOMINE HCL 20 MG
20 TABLET ORAL 2 TIMES DAILY PRN
Qty: 60 TABLET | Refills: 2 | Status: SHIPPED | OUTPATIENT
Start: 2024-09-19

## 2024-09-19 NOTE — PROGRESS NOTES
[facility] - Outpatient Consultation New Patient  Saturnino Dumont 43 y.o. male MRN: [id number]  Encounter: [id number]  ASSESSMENT AND PLAN:    1. Diarrhea, unspecified type  2. Change in bowel habit  3. Lower abdominal pain  4. Bloating  Patient presenting with change in bowel habit, diarrhea with associated lower abdominal pain and bloating for the last 4 months.  Thankfully he has not had any rectal bleeding or unintentional weight loss.  No family history of inflammatory bowel disease.  No prior colonoscopy.  Recent CBC and CMP normal.    Discussed with patient wide differential diagnosis for symptoms.  Will check CRP, TSH, celiac panel  We will also plan for stool testing with ova and parasite, Giardia, fecal calprotectin  Will schedule colonoscopy with TI intubation and pancolonic biopsies to rule out possible underlying microscopic colitis or inflammatory bowel disease  We discussed how stress can make GI symptoms worse.  I provided reassurance and encouragement.  I recommended patient start Metamucil fiber powder supplement over-the-counter once daily at dinnertime to help bulk and regulate stools.  Prescription for dicyclomine to use as needed for abdominal pain also given.    - Ambulatory Referral to Gastroenterology  - Ova and parasite examination; Future  - Giardia antigen; Future  - Calprotectin,Fecal; Future  - C-reactive protein; Future  - TSH, 3rd generation with Free T4 reflex; Future  - IgA; Future  - Tissue transglutaminase, IgA; Future  - polyethylene glycol (GOLYTELY) 4000 mL solution; Take 4,000 mL by mouth once for 1 dose Take as directed by office instructions prior to colonoscopy.  Dispense: 4000 mL; Refill: 0  - Colonoscopy; Future  - dicyclomine (BENTYL) 20 mg tablet; Take 1 tablet (20 mg total) by mouth 2 (two) times a day as needed (abdominal pain and spasm)  Dispense: 60 tablet; Refill: 2    The risk/benefits/alternatives of the procedure/s were discussed  with the patient.  Risks included, but not limited to, infection, bleeding, perforation, injury to organs in the abdomen, missed lesion and incomplete procedure were discussed.  Patient expressed understanding and agreeable to proceed with procedure/s.    Patient was instructed to call the office with any questions, concerns, new/ worsening/ persisting GI symptoms. Advised patient go to the ER with any severe or worsening abdominal pain, fevers/ chills, intractable N/V, chest pain, SOB, dizziness, lightheadedness, feeling something stuck in esophagus that will not go down. Patient expressed understanding and is in agreement with treatment plan.       Will plan to follow up after diagnostic tests   ________________________________________________________    HPI:      Patient is new to me and our office. Patient with a PMH of familial high cholesterol presents to the office today as a referral to discuss diarrhea.     Patient presents with CC of change in bowels, diarrhea, x 4 months.   Prior to 4 months ago he would have formed solid BM ~ 2 x/ day.   On average he is having now ~ 3 loose Bms/ day. Stool is very loose. He notes BMs mostly in the AM.   He endorses feeling of incomplete evacuation and abdominal bloating as well.   No blood in stool. No nocturnal stools.   With the diarrhea there is associated lower abdominal pain. This pain comes and goes. This pain can be sharp and heavy. The pain can worsen prior to a BM and improve after BM at times but not always   Patient denies any fevers/ chills, unintentional weight loss, decreased appetite,  nausea, vomiting,  black or bloody stools, heartburn, dysphagia, odynophagia.   Denies chest pain, SOB    He denies changes in diet medication or travel prior to symptom onset   He has not tried any medications for symptoms     Tobacco use- None  Alcohol use- Rare  NSAID use- None  Patient denies first-degree relative with colon cancer, inflammatory bowel disease, celiac  disease   No prior EGD or colonoscopy  No prior abdominal surgeries     He also admits to high stress levels taking care of mother and grandmother     REVIEW OF SYSTEMS:    Review of Systems   Constitutional:  Negative for chills and fever.   HENT:  Negative for ear pain and sore throat.    Eyes:  Negative for pain and visual disturbance.   Respiratory:  Negative for cough and shortness of breath.    Cardiovascular:  Negative for chest pain and palpitations.   Gastrointestinal:  Positive for abdominal pain and diarrhea. Negative for vomiting.   Genitourinary:  Negative for dysuria and hematuria.   Musculoskeletal:  Negative for arthralgias and back pain.   Skin:  Negative for color change and rash.   Neurological:  Negative for seizures and syncope.   All other systems reviewed and are negative.       Historical Information   Past Medical History:   Diagnosis Date    Back pain      No past surgical history on file.  Social History   Social History     Substance and Sexual Activity   Alcohol Use No     Social History     Substance and Sexual Activity   Drug Use No     Social History     Tobacco Use   Smoking Status Never   Smokeless Tobacco Never     No family history on file.    Meds/Allergies       Current Outpatient Medications:     rosuvastatin (CRESTOR) 5 mg tablet    No Known Allergies        Objective   Wt Readings from Last 3 Encounters:   09/18/24 104 kg (230 lb)   08/26/24 104 kg (229 lb)   05/12/18 113 kg (250 lb)     Temp Readings from Last 3 Encounters:   09/18/24 (!) 97.2 °F (36.2 °C)   08/26/24 97.6 °F (36.4 °C)   05/12/18 97.5 °F (36.4 °C) (Tympanic)     BP Readings from Last 3 Encounters:   09/18/24 106/64   08/26/24 110/64   05/12/18 115/56     Pulse Readings from Last 3 Encounters:   09/18/24 58   08/26/24 59   05/12/18 66        PHYSICAL EXAM:     Physical Exam  Vitals reviewed.   Constitutional:       General: He is not in acute distress.     Appearance: He is well-developed. He is not  ill-appearing or diaphoretic.   HENT:      Head: Normocephalic and atraumatic.   Eyes:      Conjunctiva/sclera: Conjunctivae normal.   Cardiovascular:      Rate and Rhythm: Normal rate and regular rhythm.      Heart sounds: No murmur heard.  Pulmonary:      Effort: Pulmonary effort is normal. No respiratory distress.      Breath sounds: Normal breath sounds.   Abdominal:      General: Bowel sounds are normal.      Palpations: Abdomen is soft.      Tenderness: There is abdominal tenderness in the right lower quadrant, periumbilical area and left lower quadrant.   Musculoskeletal:         General: No swelling.      Cervical back: Neck supple.   Skin:     General: Skin is warm and dry.      Capillary Refill: Capillary refill takes less than 2 seconds.   Neurological:      General: No focal deficit present.      Mental Status: He is alert and oriented to person, place, and time.   Psychiatric:         Mood and Affect: Mood normal.             Lab Results:   No visits with results within 1 Day(s) from this visit.   Latest known visit with results is:   Appointment on 08/31/2024   Component Date Value    Salmonella sp PCR 08/31/2024 Negative     Shigella sp/Enteroinvasi* 08/31/2024 Negative     Campylobacter sp (jejuni* 08/31/2024 Negative     Shiga toxin 1/Shiga toxi* 08/31/2024 Negative        Lab Results   Component Value Date    WBC 6.74 08/30/2024    HGB 14.7 08/30/2024    HCT 43.2 08/30/2024    MCV 83 08/30/2024     08/30/2024       Lab Results   Component Value Date    SODIUM 139 08/30/2024    K 4.3 08/30/2024     08/30/2024    CO2 26 08/30/2024    AGAP 9 08/30/2024    BUN 20 08/30/2024    CREATININE 0.76 08/30/2024    GLUC 97 02/16/2023    GLUF 97 08/30/2024    CALCIUM 9.3 08/30/2024    AST 18 08/30/2024    ALT 25 08/30/2024    ALKPHOS 71 08/30/2024    TP 6.9 08/30/2024    TBILI 0.62 08/30/2024    EGFR 112 08/30/2024         Kirsten Freed PA-C

## 2024-09-19 NOTE — PATIENT INSTRUCTIONS
Scheduled date of colonoscopy (as of today): 10/10/2024  Physician performing colonoscopy: Dr. Brownlee   Location of colonoscopy: [facility]  Bowel prep reviewed with patient: Golytely   Instructions reviewed with patient by: Ivory PRIETO   Clearances:  N/A

## 2024-09-25 PROBLEM — Z00.00 HEALTHCARE MAINTENANCE: Status: RESOLVED | Noted: 2024-08-26 | Resolved: 2024-09-25

## 2024-10-09 ENCOUNTER — APPOINTMENT (OUTPATIENT)
Dept: LAB | Facility: CLINIC | Age: 44
End: 2024-10-09
Payer: COMMERCIAL

## 2024-10-09 DIAGNOSIS — E78.01 FAMILIAL HYPERCHOLESTEROLEMIA: ICD-10-CM

## 2024-10-09 DIAGNOSIS — R19.4 CHANGE IN BOWEL HABIT: ICD-10-CM

## 2024-10-09 LAB
CHOLEST SERPL-MCNC: 217 MG/DL
CRP SERPL QL: 1.3 MG/L
HDLC SERPL-MCNC: 62 MG/DL
IGA SERPL-MCNC: 195 MG/DL (ref 66–433)
LDLC SERPL CALC-MCNC: 141 MG/DL (ref 0–100)
NONHDLC SERPL-MCNC: 155 MG/DL
TRIGL SERPL-MCNC: 68 MG/DL
TSH SERPL DL<=0.05 MIU/L-ACNC: 1.77 UIU/ML (ref 0.45–4.5)
TTG IGA SER-ACNC: <0.5 U/ML
TTG IGA SER-ACNC: NEGATIVE

## 2024-10-09 PROCEDURE — 86140 C-REACTIVE PROTEIN: CPT

## 2024-10-09 PROCEDURE — 82784 ASSAY IGA/IGD/IGG/IGM EACH: CPT

## 2024-10-09 PROCEDURE — 36415 COLL VENOUS BLD VENIPUNCTURE: CPT

## 2024-10-09 PROCEDURE — 86364 TISS TRNSGLTMNASE EA IG CLAS: CPT

## 2024-10-09 PROCEDURE — 80061 LIPID PANEL: CPT

## 2024-10-09 PROCEDURE — 84443 ASSAY THYROID STIM HORMONE: CPT

## 2024-11-07 ENCOUNTER — ANESTHESIA EVENT (OUTPATIENT)
Dept: ANESTHESIOLOGY | Facility: HOSPITAL | Age: 44
End: 2024-11-07

## 2024-11-07 ENCOUNTER — ANESTHESIA (OUTPATIENT)
Dept: ANESTHESIOLOGY | Facility: HOSPITAL | Age: 44
End: 2024-11-07

## 2024-11-25 ENCOUNTER — TELEPHONE (OUTPATIENT)
Dept: GASTROENTEROLOGY | Facility: CLINIC | Age: 44
End: 2024-11-25

## 2024-12-03 NOTE — TELEPHONE ENCOUNTER
Called and left a message for the patient to call back to reschedule Colonoscopy. Sent letter via myc

## 2025-05-05 ENCOUNTER — OFFICE VISIT (OUTPATIENT)
Dept: URGENT CARE | Age: 45
End: 2025-05-05
Payer: COMMERCIAL

## 2025-05-05 VITALS
BODY MASS INDEX: 34.4 KG/M2 | HEIGHT: 68 IN | TEMPERATURE: 98 F | OXYGEN SATURATION: 98 % | WEIGHT: 227 LBS | RESPIRATION RATE: 18 BRPM

## 2025-05-05 DIAGNOSIS — S70.362A TICK BITE OF LEFT THIGH, INITIAL ENCOUNTER: Primary | ICD-10-CM
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
## 2025-05-05 PROCEDURE — 99203 OFFICE O/P NEW LOW 30 MIN: CPT | Performed by: PHYSICIAN ASSISTANT

## 2025-05-05 RX ORDER — DOXYCYCLINE 100 MG/1
100 TABLET ORAL 2 TIMES DAILY
Qty: 14 TABLET | Refills: 0 | Status: SHIPPED | OUTPATIENT
Start: 2025-05-05 | End: 2025-05-12

## 2025-05-05 NOTE — PROGRESS NOTES
[facility]        NAME: Saturnino Dumont is a 44 y.o. male  : 1980    MRN: [id number]  DATE: May 5, 2025  TIME: 7:07 PM    Assessment and Plan   Tick bite of left thigh, initial encounter [S70.362A, W57.XXXA]  1. Tick bite of left thigh, initial encounter  doxycycline (ADOXA) 100 MG tablet        Tick bite was marked with surgical pen. Diameter 5.5 cm.       Patient Instructions     Patient was educated on tick bite. Patient was prescribed Doxycyline. Patient was told to take medication on full stomach. Follow up with PCP for possible lyme titer.     Any increased redness, purulent discharge or high grade fevers go to ED  Follow up with PCP in 3-5 days.  Proceed to  ER if symptoms worsen.    If tests have been performed at [facility], our office will contact you with results if changes need to be made to the care plan discussed with you at the visit.  [facility]    Chief Complaint     Chief Complaint   Patient presents with    Tick Removal     Had a tick on left upper leg on 25.  Patient now presents with bullseye rash at same spot.  Patient denies fever or joint pain.  Patient has recent history of joint pain and back pain.         History of Present Illness       Patient reports on 25 he was bit by a tick. Patient reports since then his left upper leg has itched. Recently noted redness on left lateral upper thigh. Patient has a concern for lyme. Admits fatigue. Admits allergy to Latex        Review of Systems   Review of Systems   Constitutional: Negative.    Respiratory: Negative.     Cardiovascular: Negative.    Skin:         Possible tick bite to left lateral upper leg   Psychiatric/Behavioral: Negative.           Current Medications       Current Outpatient Medications:     dicyclomine (BENTYL) 20 mg tablet, Take 1 tablet (20 mg total) by mouth 2 (two) times a day as needed (abdominal pain and spasm), Disp: 60 tablet, Rfl: 2     "doxycycline (ADOXA) 100 MG tablet, Take 1 tablet (100 mg total) by mouth 2 (two) times a day for 7 days, Disp: 14 tablet, Rfl: 0    rosuvastatin (CRESTOR) 5 mg tablet, Take 1 tablet (5 mg total) by mouth daily, Disp: 30 tablet, Rfl: 5    polyethylene glycol (GOLYTELY) 4000 mL solution, Take 4,000 mL by mouth once for 1 dose Take as directed by office instructions prior to colonoscopy., Disp: 4000 mL, Rfl: 0    Current Allergies     Allergies as of 05/05/2025 - Reviewed 05/05/2025   Allergen Reaction Noted    Latex Rash 09/11/2018            The following portions of the patient's history were reviewed and updated as appropriate: allergies, current medications, past family history, past medical history, past social history, past surgical history and problem list.     Past Medical History:   Diagnosis Date    Back pain        History reviewed. No pertinent surgical history.    History reviewed. No pertinent family history.      Medications have been verified.        Objective   Temp 98 °F (36.7 °C) (Tympanic)   Resp 18   Ht 5' 8\" (1.727 m)   Wt 103 kg (227 lb)   SpO2 98%   BMI 34.52 kg/m²   No LMP for male patient.       Physical Exam     Physical Exam  Vitals and nursing note reviewed.   Constitutional:       Appearance: Normal appearance.   HENT:      Head: Normocephalic.      Right Ear: Tympanic membrane, ear canal and external ear normal.      Left Ear: Tympanic membrane, ear canal and external ear normal.      Mouth/Throat:      Mouth: Mucous membranes are moist.   Cardiovascular:      Rate and Rhythm: Normal rate and regular rhythm.      Heart sounds: Normal heart sounds.   Pulmonary:      Breath sounds: Normal breath sounds. No wheezing.   Skin:     Comments: 5.5 cm diameter of redness on lateral upper left thigh   Neurological:      General: No focal deficit present.      Mental Status: He is alert and oriented to person, place, and time.   Psychiatric:         Mood and Affect: Mood normal.         " Behavior: Behavior normal.

## 2025-05-05 NOTE — PATIENT INSTRUCTIONS
Patient was educated on tick bite. Patient was prescribed Doxycyline. Patient was told to take medication on full stomach. Follow up with PCP for possible lyme titer.     Any increased redness, purulent discharge or high grade fevers go to ED

## 2025-05-07 ENCOUNTER — NURSE TRIAGE (OUTPATIENT)
Age: 45
End: 2025-05-07

## 2025-05-07 NOTE — TELEPHONE ENCOUNTER
"FOLLOW UP: Office visit scheduled for tomorrow afternoon    REASON FOR CONVERSATION: Tick Bite    SYMPTOMS: Patient removed tick on 4/19, did not remove head. Seen in urgent care 5/5 for bulls eye rash at site. Started on doxycyline. Patient complains of headache, fatigue, itchiness at site. Redness has not gone outside [place] made by urgent care physician and no drainage or fever. Thinks left leg feels achy but unsure if he is just thinking too much about it    OTHER: Patient will go to ED if redness worsens, site becomes warm or painful, notices any drainage, or develops a fever    DISPOSITION: See Today or Tomorrow in Office (overriding See Today in Office)    Reason for Disposition   Patient wants to be seen    Answer Assessment - Initial Assessment Questions  1. ATTACHED:  \"Is the tick still on the skin?\"  (e.g., yes, no, unsure)      Denies  2. ONSET - TICK STILL ATTACHED:  \"How long do you think the tick has been on your skin?\" (e.g., hours, days, unsure)  Note:  Is there a recent activity (camping, hiking) where the caller may have been exposed?      N/A  3. ONSET - TICK NOT STILL ATTACHED: \"If the tick has been removed, how long do you think the tick was attached before you removed it?\" (e.g., 5 hours, 2 days). \"When was this?\"      1 day  4. LOCATION: \"Where is the tick bite located?\" (e.g., arm, leg)      Outside of left thigh  5. TYPE of TICK: \"Is it a wood tick or a deer tick?\" (e.g., deer tick, wood tick; unsure)      Unsure at time  6. SIZE of TICK: \"How big is the tick?\" (e.g., size of poppy seed, apple seed, watermelon seed; unsure) Note: Deer ticks can be the size of a poppy seed (nymph) or an apple seed (adult).        Size of candy nerd  7. ENGORGED: \"Did the tick look flat or engorged (full, swollen)?\" (e.g., flat, engorged; unsure)      full  8. OTHER SYMPTOMS: \"Do you have any other symptoms?\" (e.g., fever, rash, redness at bite area, red ring around bite)      Tick was partially removed 4/19, " seen at urgent care 5/5 and started on doxycyline. Now has fatigue, headache, muscle weakness in left leg. Urgent care doctor jailyn [place] around site and staying the same. Site is itchy    Protocols used: Tick Bite-Adult-[place]

## 2025-05-08 ENCOUNTER — OFFICE VISIT (OUTPATIENT)
Age: 45
End: 2025-05-08
Payer: COMMERCIAL

## 2025-05-08 VITALS
RESPIRATION RATE: 16 BRPM | HEART RATE: 73 BPM | HEIGHT: 68 IN | SYSTOLIC BLOOD PRESSURE: 120 MMHG | WEIGHT: 237 LBS | TEMPERATURE: 97.6 F | OXYGEN SATURATION: 97 % | DIASTOLIC BLOOD PRESSURE: 70 MMHG | BODY MASS INDEX: 35.92 KG/M2
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
## 2025-05-08 DIAGNOSIS — S70.362D TICK BITE OF LEFT THIGH, SUBSEQUENT ENCOUNTER: ICD-10-CM

## 2025-05-08 DIAGNOSIS — Z00.00 HEALTHCARE MAINTENANCE: ICD-10-CM

## 2025-05-08 DIAGNOSIS — Z99.89 DEPENDENCE ON CANE: ICD-10-CM

## 2025-05-08 DIAGNOSIS — E78.01 FAMILIAL HYPERCHOLESTEROLEMIA: Primary | ICD-10-CM

## 2025-05-08 PROBLEM — S70.362A TICK BITE OF LEFT THIGH: Status: ACTIVE | Noted: 2025-05-08
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
## 2025-05-08 PROCEDURE — 99213 OFFICE O/P EST LOW 20 MIN: CPT | Performed by: INTERNAL MEDICINE

## 2025-05-08 RX ORDER — DOXYCYCLINE HYCLATE 100 MG
100 TABLET ORAL 2 TIMES DAILY
Qty: 42 TABLET | Refills: 0 | Status: SHIPPED | OUTPATIENT
Start: 2025-05-08 | End: 2025-05-29

## 2025-05-08 NOTE — ASSESSMENT & PLAN NOTE
The patient is overdue for a routine yearly physical.  Patient was given a slip for complete labs to be performed in the near future and will return to the office for physical which has been arranged in the future.    Orders:    Comprehensive metabolic panel; Future    CBC and differential; Future    Lipid panel; Future    Urinalysis with microscopic; Future

## 2025-05-08 NOTE — PROGRESS NOTES
Name: Saturnino Dumont      : 1980      MRN: [id number]  Encounter Provider: Eleazar Blum DO  Encounter Date: 2025   Encounter department: [facility] INTERNAL MEDICINE    Assessment & Plan  Tick bite of left thigh, subsequent encounter  Patient was seen recently in the emergency room.  Had a tick bite to the lateral portion of his thigh on the left.  He states this started approximately around Easter this year.  Because of the rash that developed to the area he went for evaluation and they felt the patient had a rash consistent with erythema migrans which would indicate Lyme disease.  Patient when he was seen in the emergency room did not have any muscle aches or pains but he states he does have them now but only slightly.  On evaluation patient has a very regular brown to his rash to his lateral thigh.  He states that at this point being only on the antibiotics for short period of time this has not progressed but it really has not enlarged and they placed markers on his thigh in order to follow-up with his size.  He has had no difficulty taking the doxycycline as directed but they only gave him 1 weeks worth and the standard of care is a total usually of approximately 3 weeks for Lyme disease.  New prescription was sent to the pharmacy which should cover the duration of need for treatment for a tick bite with Lyme's disease.  He will call if any side effects or problems.    Orders:  •  doxycycline hyclate (VIBRA-TABS) 100 mg tablet; Take 1 tablet (100 mg total) by mouth 2 (two) times a day for 21 days    Familial hypercholesterolemia  Family history of hyperlipidemia.  He admits that he is not perfect with his diet and he remains on Crestor 5 mg daily.  Check a lipid profile with upcoming visit.  Make modification of treatment if necessary.    Orders:  •  Comprehensive metabolic panel; Future    Healthcare maintenance  The patient is overdue for a routine yearly physical.  Patient was  given a slip for complete labs to be performed in the near future and will return to the office for physical which has been arranged in the future.    Orders:  •  Comprehensive metabolic panel; Future  •  CBC and differential; Future  •  Lipid panel; Future  •  Urinalysis with microscopic; Future    Dependence on cane              History of Present Illness     Patient is a 44-year-old male history of medical problems outlined previously who is here today for acute evaluation.  Patient states apparently he did receive a tick bite to his left thigh laterally he states approximately around Easter.  Patient began to develop a rash with red raised circular area surrounding the tick bite and was seen in the emergency room diagnosed with Lyme disease placed on doxycycline.  Patient is here for reevaluation.  Patient states that he is tolerating the medication was only given 1 week.  The rash has not enlarged in size.  Only mild arthritic aches and pains.  Review of Systems   Constitutional: Negative.    HENT: Negative.     Eyes: Negative.    Respiratory: Negative.     Cardiovascular: Negative.    Gastrointestinal: Negative.    Endocrine: Negative.    Genitourinary: Negative.    Musculoskeletal:  Positive for arthralgias and myalgias. Negative for back pain, gait problem, joint swelling and neck pain.   Skin:  Positive for rash. Negative for color change, pallor and wound.   Allergic/Immunologic: Negative.    Neurological: Negative.    Hematological: Negative.    Psychiatric/Behavioral: Negative.     Past Medical History:   Diagnosis Date   • Back pain      No past surgical history on file.  No family history on file.  Social History     Tobacco Use   • Smoking status: Never   • Smokeless tobacco: Never   Vaping Use   • Vaping status: Never Used   Substance and Sexual Activity   • Alcohol use: No   • Drug use: No   • Sexual activity: Not on file     Current Outpatient Medications on File Prior to Visit   Medication Sig   •  "doxycycline (ADOXA) 100 MG tablet Take 1 tablet (100 mg total) by mouth 2 (two) times a day for 7 days   • rosuvastatin (CRESTOR) 5 mg tablet Take 1 tablet (5 mg total) by mouth daily   • dicyclomine (BENTYL) 20 mg tablet Take 1 tablet (20 mg total) by mouth 2 (two) times a day as needed (abdominal pain and spasm) (Patient not taking: Reported on 5/8/2025)   • polyethylene glycol (GOLYTELY) 4000 mL solution Take 4,000 mL by mouth once for 1 dose Take as directed by office instructions prior to colonoscopy. (Patient not taking: Reported on 5/8/2025)     Allergies   Allergen Reactions   • Latex Rash       There is no immunization history on file for this patient.  Objective   /70   Pulse 73   Temp 97.6 °F (36.4 °C)   Resp 16   Ht 5' 8\" (1.727 m)   Wt 108 kg (237 lb)   SpO2 97%   BMI 36.04 kg/m²     Physical Exam  Vitals and nursing note reviewed.   Constitutional:       General: He is not in acute distress.     Appearance: Normal appearance. He is obese. He is not ill-appearing, toxic-appearing or diaphoretic.      Comments: Articulate, obese 44-year-old male who is awake alert in no acute distress oriented x 3   HENT:      Head: Normocephalic and atraumatic.      Right Ear: Tympanic membrane normal. There is no impacted cerumen.      Left Ear: Tympanic membrane normal. There is no impacted cerumen.      Nose: Nose normal. No congestion or rhinorrhea.      Mouth/Throat:      Mouth: Mucous membranes are moist.      Pharynx: Oropharynx is clear. No oropharyngeal exudate or posterior oropharyngeal erythema.   Eyes:      General: No scleral icterus.        Right eye: No discharge.         Left eye: No discharge.      Extraocular Movements: Extraocular movements intact.      Conjunctiva/sclera: Conjunctivae normal.      Pupils: Pupils are equal, round, and reactive to light.   Cardiovascular:      Rate and Rhythm: Normal rate and regular rhythm.   Pulmonary:      Effort: Pulmonary effort is normal.      Breath " sounds: Normal breath sounds.   Abdominal:      General: Abdomen is flat. Bowel sounds are normal.      Palpations: Abdomen is soft.   Musculoskeletal:         General: Normal range of motion.      Cervical back: Normal range of motion and neck supple.   Skin:     Coloration: Skin is not pale.      Findings: Lesion and rash present. No erythema.      Comments: Red slightly raised circular rash area of his thigh is noted, consistent with Lyme disease   Neurological:      General: No focal deficit present.      Mental Status: He is alert and oriented to person, place, and time. Mental status is at baseline.   Psychiatric:         Mood and Affect: Mood normal.         Behavior: Behavior normal.         Thought Content: Thought content normal.         Judgment: Judgment normal.

## 2025-05-08 NOTE — ASSESSMENT & PLAN NOTE
Patient was seen recently in the emergency room.  Had a tick bite to the lateral portion of his thigh on the left.  He states this started approximately around Easter this year.  Because of the rash that developed to the area he went for evaluation and they felt the patient had a rash consistent with erythema migrans which would indicate Lyme disease.  Patient when he was seen in the emergency room did not have any muscle aches or pains but he states he does have them now but only slightly.  On evaluation patient has a very regular brown to his rash to his lateral thigh.  He states that at this point being only on the antibiotics for short period of time this has not progressed but it really has not enlarged and they placed markers on his thigh in order to follow-up with his size.  He has had no difficulty taking the doxycycline as directed but they only gave him 1 weeks worth and the standard of care is a total usually of approximately 3 weeks for Lyme disease.  New prescription was sent to the pharmacy which should cover the duration of need for treatment for a tick bite with Lyme's disease.  He will call if any side effects or problems.    Orders:    doxycycline hyclate (VIBRA-TABS) 100 mg tablet; Take 1 tablet (100 mg total) by mouth 2 (two) times a day for 21 days

## 2025-05-08 NOTE — ASSESSMENT & PLAN NOTE
Family history of hyperlipidemia.  He admits that he is not perfect with his diet and he remains on Crestor 5 mg daily.  Check a lipid profile with upcoming visit.  Make modification of treatment if necessary.    Orders:    Comprehensive metabolic panel; Future

## 2025-05-15 ENCOUNTER — APPOINTMENT (OUTPATIENT)
Dept: LAB | Facility: CLINIC | Age: 45
End: 2025-05-15
Payer: COMMERCIAL

## 2025-05-15 DIAGNOSIS — E78.01 FAMILIAL HYPERCHOLESTEROLEMIA: ICD-10-CM

## 2025-05-15 DIAGNOSIS — Z00.00 HEALTHCARE MAINTENANCE: ICD-10-CM

## 2025-05-15 LAB
ALBUMIN SERPL BCG-MCNC: 4.3 G/DL (ref 3.5–5)
ALP SERPL-CCNC: 78 U/L (ref 34–104)
ALT SERPL W P-5'-P-CCNC: 24 U/L (ref 7–52)
ANION GAP SERPL CALCULATED.3IONS-SCNC: 5 MMOL/L (ref 4–13)
AST SERPL W P-5'-P-CCNC: 20 U/L (ref 13–39)
BACTERIA UR QL AUTO: ABNORMAL /HPF
BASOPHILS # BLD AUTO: 0.02 THOUSANDS/ÂΜL (ref 0–0.1)
BASOPHILS NFR BLD AUTO: 0 % (ref 0–1)
BILIRUB SERPL-MCNC: 0.59 MG/DL (ref 0.2–1)
BILIRUB UR QL STRIP: NEGATIVE
BUN SERPL-MCNC: 17 MG/DL (ref 5–25)
CALCIUM SERPL-MCNC: 9.2 MG/DL (ref 8.4–10.2)
CHLORIDE SERPL-SCNC: 105 MMOL/L (ref 96–108)
CHOLEST SERPL-MCNC: 191 MG/DL (ref ?–200)
CLARITY UR: CLEAR
CO2 SERPL-SCNC: 29 MMOL/L (ref 21–32)
COLOR UR: ABNORMAL
CREAT SERPL-MCNC: 0.76 MG/DL (ref 0.6–1.3)
EOSINOPHIL # BLD AUTO: 0.09 THOUSAND/ÂΜL (ref 0–0.61)
EOSINOPHIL NFR BLD AUTO: 2 % (ref 0–6)
ERYTHROCYTE [DISTWIDTH] IN BLOOD BY AUTOMATED COUNT: 13.5 % (ref 11.6–15.1)
GFR SERPL CREATININE-BSD FRML MDRD: 111 ML/MIN/1.73SQ M
GLUCOSE P FAST SERPL-MCNC: 99 MG/DL (ref 65–99)
GLUCOSE UR STRIP-MCNC: NEGATIVE MG/DL
HCT VFR BLD AUTO: 40.6 % (ref 36.5–49.3)
HDLC SERPL-MCNC: 65 MG/DL
HGB BLD-MCNC: 13.9 G/DL (ref 12–17)
HGB UR QL STRIP.AUTO: NEGATIVE
IMM GRANULOCYTES # BLD AUTO: 0.02 THOUSAND/UL (ref 0–0.2)
IMM GRANULOCYTES NFR BLD AUTO: 0 % (ref 0–2)
KETONES UR STRIP-MCNC: NEGATIVE MG/DL
LDLC SERPL CALC-MCNC: 113 MG/DL (ref 0–100)
LEUKOCYTE ESTERASE UR QL STRIP: NEGATIVE
LYMPHOCYTES # BLD AUTO: 2.61 THOUSANDS/ÂΜL (ref 0.6–4.47)
LYMPHOCYTES NFR BLD AUTO: 44 % (ref 14–44)
MCH RBC QN AUTO: 27.8 PG (ref 26.8–34.3)
MCHC RBC AUTO-ENTMCNC: 34.2 G/DL (ref 31.4–37.4)
MCV RBC AUTO: 81 FL (ref 82–98)
MONOCYTES # BLD AUTO: 0.72 THOUSAND/ÂΜL (ref 0.17–1.22)
MONOCYTES NFR BLD AUTO: 12 % (ref 4–12)
NEUTROPHILS # BLD AUTO: 2.46 THOUSANDS/ÂΜL (ref 1.85–7.62)
NEUTS SEG NFR BLD AUTO: 42 % (ref 43–75)
NITRITE UR QL STRIP: NEGATIVE
NON-SQ EPI CELLS URNS QL MICRO: ABNORMAL /HPF
NONHDLC SERPL-MCNC: 126 MG/DL
NRBC BLD AUTO-RTO: 0 /100 WBCS
PH UR STRIP.AUTO: 6 [PH]
PLATELET # BLD AUTO: 235 THOUSANDS/UL (ref 149–390)
PMV BLD AUTO: 10.4 FL (ref 8.9–12.7)
POTASSIUM SERPL-SCNC: 4.1 MMOL/L (ref 3.5–5.3)
PROT SERPL-MCNC: 6.9 G/DL (ref 6.4–8.4)
PROT UR STRIP-MCNC: NEGATIVE MG/DL
RBC # BLD AUTO: 5 MILLION/UL (ref 3.88–5.62)
RBC #/AREA URNS AUTO: ABNORMAL /HPF
SODIUM SERPL-SCNC: 139 MMOL/L (ref 135–147)
SP GR UR STRIP.AUTO: 1.02 (ref 1–1.03)
TRIGL SERPL-MCNC: 67 MG/DL (ref ?–150)
UROBILINOGEN UR STRIP-ACNC: <2 MG/DL
WBC # BLD AUTO: 5.92 THOUSAND/UL (ref 4.31–10.16)
WBC #/AREA URNS AUTO: ABNORMAL /HPF

## 2025-05-15 PROCEDURE — 81001 URINALYSIS AUTO W/SCOPE: CPT

## 2025-05-15 PROCEDURE — 85025 COMPLETE CBC W/AUTO DIFF WBC: CPT

## 2025-05-15 PROCEDURE — 80061 LIPID PANEL: CPT

## 2025-05-15 PROCEDURE — 36415 COLL VENOUS BLD VENIPUNCTURE: CPT

## 2025-05-15 PROCEDURE — 80053 COMPREHEN METABOLIC PANEL: CPT

## 2025-06-07 PROBLEM — Z00.00 HEALTHCARE MAINTENANCE: Status: RESOLVED | Noted: 2024-08-26 | Resolved: 2025-06-07

## 2025-06-24 ENCOUNTER — OFFICE VISIT (OUTPATIENT)
Age: 45
End: 2025-06-24
Payer: COMMERCIAL

## 2025-06-24 VITALS
TEMPERATURE: 98.3 F | DIASTOLIC BLOOD PRESSURE: 70 MMHG | RESPIRATION RATE: 16 BRPM | HEIGHT: 68 IN | OXYGEN SATURATION: 97 % | SYSTOLIC BLOOD PRESSURE: 106 MMHG | WEIGHT: 234 LBS | HEART RATE: 62 BPM | BODY MASS INDEX: 35.46 KG/M2

## 2025-06-24 DIAGNOSIS — R19.7 DIARRHEA, UNSPECIFIED TYPE: ICD-10-CM
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
## 2025-06-24 DIAGNOSIS — Z00.00 HEALTHCARE MAINTENANCE: ICD-10-CM

## 2025-06-24 DIAGNOSIS — E78.01 FAMILIAL HYPERCHOLESTEROLEMIA: ICD-10-CM

## 2025-06-24 DIAGNOSIS — S70.362D TICK BITE OF LEFT THIGH, SUBSEQUENT ENCOUNTER: Primary | ICD-10-CM

## 2025-06-24 PROCEDURE — 99213 OFFICE O/P EST LOW 20 MIN: CPT | Performed by: INTERNAL MEDICINE

## 2025-06-24 NOTE — ASSESSMENT & PLAN NOTE
Did have a complete workup evaluation by gastroenterology states that his bowel function has normalized.  Occasionally has some loose stools but not on daily basis and he does associate that with certain foods that he might be eating.  To continue to call if any problems.

## 2025-06-24 NOTE — ASSESSMENT & PLAN NOTE
Family history of hyperlipidemia.  Patient remains on rosuvastatin 5 mg daily but he admits that he is not always compliant taking medication on a daily basis.  We did give the patient some tips as to how to become more compliant taking the medication.

## 2025-06-24 NOTE — ASSESSMENT & PLAN NOTE
Patient will be due for a routine physical when he returns to the office in 6 months.  He was given a slip for complete labs to be performed prior to that visit.  In the interim he was told if any new medical problems or concerns to please call.    Orders:    Comprehensive metabolic panel; Future    CBC and differential; Future    Lipid panel; Future    Urinalysis with microscopic; Future

## 2025-06-24 NOTE — ASSESSMENT & PLAN NOTE
With patient suffering from an acute tick bite.  Was noted to be a deer tick.  Patient was placed on doxycycline 100 mg twice a day for total of 3 weeks.  Patient states he tolerated the medication without problems but he is still complaining of some diffuse achiness.  Denies any fever or chills.  No respiratory difficulties and no chest pain or pressure and no increasing shortness of breath.  Patient does have concerns and wants to be sure that the level Lyme disease is completely eradicated.  We did discuss with him that the lipid levels still can be high and this is a process that shows he does have immunity.  Will be checking IgG IgM levels.  Looking for IgG levels but hopefully eradication of IgM    Orders:    Lyme Total AB W Reflex to IGM/IGG; Future

## 2025-06-24 NOTE — PROGRESS NOTES
Name: Saturnino Dumont      : 1980      MRN: [id number]  Encounter Provider: Eleazar Blum DO  Encounter Date: 2025   Encounter department: [facility] INTERNAL MEDICINE    Assessment & Plan  Healthcare maintenance  Patient will be due for a routine physical when he returns to the office in 6 months.  He was given a slip for complete labs to be performed prior to that visit.  In the interim he was told if any new medical problems or concerns to please call.    Orders:  •  Comprehensive metabolic panel; Future  •  CBC and differential; Future  •  Lipid panel; Future  •  Urinalysis with microscopic; Future    Diarrhea, unspecified type  Did have a complete workup evaluation by gastroenterology states that his bowel function has normalized.  Occasionally has some loose stools but not on daily basis and he does associate that with certain foods that he might be eating.  To continue to call if any problems.         Tick bite of left thigh, subsequent encounter  With patient suffering from an acute tick bite.  Was noted to be a deer tick.  Patient was placed on doxycycline 100 mg twice a day for total of 3 weeks.  Patient states he tolerated the medication without problems but he is still complaining of some diffuse achiness.  Denies any fever or chills.  No respiratory difficulties and no chest pain or pressure and no increasing shortness of breath.  Patient does have concerns and wants to be sure that the level Lyme disease is completely eradicated.  We did discuss with him that the lipid levels still can be high and this is a process that shows he does have immunity.  Will be checking IgG IgM levels.  Looking for IgG levels but hopefully eradication of IgM    Orders:  •  Lyme Total AB W Reflex to IGM/IGG; Future    Familial hypercholesterolemia  Family history of hyperlipidemia.  Patient remains on rosuvastatin 5 mg daily but he admits that he is not always compliant taking medication on a  "daily basis.  We did give the patient some tips as to how to become more compliant taking the medication.              History of Present Illness     44-year-old male history of medical problems outlined previously who is here today for follow-up.  Because of a tick bite he was treated for Lyme disease since his epidemic to the area.  Patient states he is finished antibiotics still has some muscle aches and pains and he is not sure whether this is related to that illness even though he completed a full course of antibiotics for 3 weeks.  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  Patient relates has only taken the medication for his cholesterol maybe 2-3 times.  Continues to forget to take it.  Review of Systems   Constitutional: Negative.    HENT: Negative.     Eyes: Negative.    Respiratory: Negative.     Cardiovascular: Negative.    Gastrointestinal: Negative.    Endocrine: Negative.    Genitourinary: Negative.    Musculoskeletal:  Positive for arthralgias. Negative for back pain, gait problem, joint swelling, myalgias, neck pain and neck stiffness.   Skin: Negative.    Allergic/Immunologic: Negative.    Neurological: Negative.    Hematological: Negative.    Psychiatric/Behavioral: Negative.     Past Medical History[1]  Past Surgical History[2]  Family History[3]  Social History[4]  Medications[5]  Allergies   Allergen Reactions   • Latex Rash       There is no immunization history on file for this patient.  Objective   /70   Pulse 62   Temp 98.3 °F (36.8 °C)   Resp 16   Ht 5' 8\" (1.727 m)   Wt 106 kg (234 lb)   SpO2 97%   BMI 35.58 kg/m²     Physical Exam  Vitals and nursing note reviewed.   Constitutional:       General: He is not in acute distress.     Appearance: Normal appearance. He is obese. He is not ill-appearing, toxic-appearing or diaphoretic.      " Comments: Pleasant, obese, articulate 44-year-old male who is awake alert in no acute distress oriented x 3   HENT:      Head: Normocephalic and atraumatic.      Right Ear: Tympanic membrane normal. There is no impacted cerumen.      Left Ear: Tympanic membrane normal. There is no impacted cerumen.      Nose: Nose normal. No congestion or rhinorrhea.      Mouth/Throat:      Mouth: Mucous membranes are moist.      Pharynx: Oropharynx is clear. No oropharyngeal exudate or posterior oropharyngeal erythema.     Eyes:      General: No scleral icterus.        Right eye: No discharge.         Left eye: No discharge.      Extraocular Movements: Extraocular movements intact.      Conjunctiva/sclera: Conjunctivae normal.      Pupils: Pupils are equal, round, and reactive to light.     Neck:      Vascular: No carotid bruit.     Cardiovascular:      Rate and Rhythm: Normal rate and regular rhythm.      Pulses: Normal pulses.      Heart sounds: Normal heart sounds. No murmur heard.     No friction rub. No gallop.   Pulmonary:      Effort: Pulmonary effort is normal. No respiratory distress.      Breath sounds: Normal breath sounds. No stridor. No wheezing, rhonchi or rales.   Chest:      Chest wall: No tenderness.   Abdominal:      General: Abdomen is flat. Bowel sounds are normal. There is no distension.      Palpations: Abdomen is soft. There is no mass.      Tenderness: There is no abdominal tenderness. There is no right CVA tenderness, left CVA tenderness, guarding or rebound.      Hernia: No hernia is present.     Musculoskeletal:         General: No swelling, tenderness, deformity or signs of injury. Normal range of motion.      Cervical back: Normal range of motion and neck supple. No rigidity or tenderness.      Right lower leg: No edema.      Left lower leg: No edema.   Lymphadenopathy:      Cervical: No cervical adenopathy.     Skin:     Coloration: Skin is not jaundiced or pale.      Findings: No bruising, erythema,  lesion or rash.     Neurological:      General: No focal deficit present.      Mental Status: He is alert and oriented to person, place, and time. Mental status is at baseline.      Cranial Nerves: No cranial nerve deficit.      Sensory: No sensory deficit.      Motor: No weakness.      Coordination: Coordination normal.      Gait: Gait normal.     Psychiatric:         Mood and Affect: Mood normal.         Behavior: Behavior normal.         Thought Content: Thought content normal.         Judgment: Judgment normal.            [1]  Past Medical History:  Diagnosis Date   • Back pain    [2]  No past surgical history on file.  [3]  No family history on file.  [4]  Social History  Tobacco Use   • Smoking status: Never   • Smokeless tobacco: Never   Vaping Use   • Vaping status: Never Used   Substance and Sexual Activity   • Alcohol use: No   • Drug use: No   [5]  Current Outpatient Medications on File Prior to Visit   Medication Sig   • rosuvastatin (CRESTOR) 5 mg tablet Take 1 tablet (5 mg total) by mouth daily   • dicyclomine (BENTYL) 20 mg tablet Take 1 tablet (20 mg total) by mouth 2 (two) times a day as needed (abdominal pain and spasm) (Patient not taking: Reported on 6/24/2025)   • polyethylene glycol (GOLYTELY) 4000 mL solution Take 4,000 mL by mouth once for 1 dose Take as directed by office instructions prior to colonoscopy. (Patient not taking: Reported on 5/8/2025)

## 2025-06-25 ENCOUNTER — APPOINTMENT (OUTPATIENT)
Dept: LAB | Facility: CLINIC | Age: 45
End: 2025-06-25
Payer: COMMERCIAL

## 2025-06-25 DIAGNOSIS — S70.362D TICK BITE OF LEFT THIGH, SUBSEQUENT ENCOUNTER: ICD-10-CM
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
## 2025-06-25 PROCEDURE — 86617 LYME DISEASE ANTIBODY: CPT

## 2025-06-25 PROCEDURE — 86618 LYME DISEASE ANTIBODY: CPT

## 2025-06-25 PROCEDURE — 36415 COLL VENOUS BLD VENIPUNCTURE: CPT

## 2025-06-26 LAB
B BURGDOR IGG SERPL QL IA: POSITIVE
B BURGDOR IGG+IGM SER QL IA: POSITIVE
B BURGDOR IGM SERPL QL IA: NEGATIVE